# Patient Record
Sex: MALE | Race: WHITE | HISPANIC OR LATINO | Employment: FULL TIME | URBAN - METROPOLITAN AREA
[De-identification: names, ages, dates, MRNs, and addresses within clinical notes are randomized per-mention and may not be internally consistent; named-entity substitution may affect disease eponyms.]

---

## 2021-04-02 NOTE — PROGRESS NOTES
General Surgery History and Physical      Summary:    Mr. Arturo Hooks is a 46 y.o. gentleman with a posterior left calf mass.  Firm and painful.  Will check an ultrasound to further characterize prior to discussing excision.    Referring Provider: No ref. provider found    Chief Complaint:    Left leg mass    History of Present Illness:    Mr. Arturo Hooks is a 46 y.o. gentleman who presents with a several year history of left calf mass that is increasing in size.  He states it causes pain in his causing more more discomfort.  No drainage, redness, or signs of infection.    Past Medical History:   • None    Past Surgical History:    • Left shoulder surgery    Family History:    • No family history of colon cancer    Social History:    • Denies tobacco use  • Denies alcohol use    Allergies:   Not on File    Medications:   • None    Radiology/Endoscopy:    • No prior imaging  • No prior endoscopy    Labs:    • No recent labs    Review of Systems:   Influenza-like illness: no fever, no  cough, no  sore throat, no  body aches, no loss of sense of taste or smell, no known exposure to person with Covid-19.  Constitutional: Negative for fevers or chills  HENT: Negative for hearing loss or runny nose  Eyes: Negative for vision changes or scleral icterus  Respiratory: Negative for cough or shortness of breath  Cardiovascular: Negative for chest pain or heart palpitations  Gastrointestinal: Negative for abdominal pain, nausea, vomiting, constipation, melena, or hematochezia  Genitourinary: Negative for hematuria or dysuria  Musculoskeletal: Negative for joint swelling or gait instability  Neurologic: Negative for tremors or seizures  Psychiatric: Negative for suicidal ideations or depression  All other systems reviewed and negative    Physical Exam:   • Constitutional: Well-developed well-nourished, no acute distress  • Eyes: Conjunctiva normal, sclera nonicteric  • ENMT: Hearing grossly normal, oral mucosa moist  • Neck:  Supple, trachea midline  • Respiratory: No increased work of breathing, normal inspiratory effort  • Cardiovascular: Regular rate, no peripheral edema, no jugular venous distention  • Gastrointestinal: Soft, nontender  • Skin:  Warm, dry, no rash on visualized skin surfaces; 3 cm firm mass that is painful to the touch on posterior left calf at inferior border of the gastroc  • Musculoskeletal: Symmetric strength, normal gait  • Psychiatric: Alert and oriented ×3, normal affect       Cyndi Magana M.D.  General and Endoscopic Surgery  [facility], [place], operated by [facility]    [address] [place] [address], [address]  [place], [place], [address]  P: [phone]  F: [phone]

## 2021-04-05 ENCOUNTER — OFFICE VISIT (OUTPATIENT)
Dept: SURGERY | Facility: CLINIC | Age: 46
End: 2021-04-05

## 2021-04-05 VITALS — HEIGHT: 72 IN | BODY MASS INDEX: 34.89 KG/M2 | WEIGHT: 257.6 LBS

## 2021-04-05 DIAGNOSIS — R22.42 LEG MASS, LEFT: Primary | ICD-10-CM

## 2021-04-05 PROCEDURE — 99203 OFFICE O/P NEW LOW 30 MIN: CPT | Performed by: STUDENT IN AN ORGANIZED HEALTH CARE EDUCATION/TRAINING PROGRAM

## 2021-05-05 DIAGNOSIS — R22.42 LEG MASS, LEFT: Primary | ICD-10-CM

## 2021-05-21 ENCOUNTER — HOSPITAL ENCOUNTER (OUTPATIENT)
Dept: ULTRASOUND IMAGING | Facility: HOSPITAL | Age: 46
Discharge: HOME | End: 2021-05-21
Admitting: STUDENT IN AN ORGANIZED HEALTH CARE EDUCATION/TRAINING PROGRAM

## 2021-05-21 DIAGNOSIS — R22.42 LEG MASS, LEFT: ICD-10-CM

## 2021-05-21 PROCEDURE — 76882 US LMTD JT/FCL EVL NVASC XTR: CPT

## 2021-05-25 ENCOUNTER — TELEPHONE (OUTPATIENT)
Dept: SURGERY | Facility: CLINIC | Age: 46
End: 2021-05-25

## 2021-05-25 NOTE — TELEPHONE ENCOUNTER
Spoke with Mr. Hooks and discussed recent ultrasound results. 2cm nodule with no surrounding attachments to muscle or skin. Cannot rule out benign versus malignant until excision. Ok for in office procedure. He will call to schedule when he returns in town in the middle of June.    FINDINGS: Ultrasound of the posterior left calf performed. Centered  within the subcutaneous fat there is a hypoechoic 20 x 11 x 13 mm  nodule. It is heterogenous in echotexture, overall hypoechoic with some  areas of increased central echogenicity. This is not extend to the skin  surface or the underlying muscle. This could be either hypoechoic solid  or complex cystic. This may simply represent a sebaceous cyst. Malignant  or benign mesenchymal soft tissue tumor not excluded. This can be  aspirated/biopsied using sonographic guidance if indicated.    Cyndi Magana MD